# Patient Record
Sex: MALE | Race: WHITE | Employment: FULL TIME | URBAN - METROPOLITAN AREA
[De-identification: names, ages, dates, MRNs, and addresses within clinical notes are randomized per-mention and may not be internally consistent; named-entity substitution may affect disease eponyms.]

---

## 2020-01-30 ENCOUNTER — COMPLETE SKIN EXAM (OUTPATIENT)
Dept: URBAN - METROPOLITAN AREA CLINIC 14 | Facility: CLINIC | Age: 50
Setting detail: DERMATOLOGY
End: 2020-01-30

## 2020-01-30 ENCOUNTER — RX ONLY (RX ONLY)
Age: 50
End: 2020-01-30

## 2020-01-30 DIAGNOSIS — L82.1 OTHER SEBORRHEIC KERATOSIS: ICD-10-CM

## 2020-01-30 DIAGNOSIS — L85.3 XEROSIS CUTIS: ICD-10-CM

## 2020-01-30 DIAGNOSIS — Z85.828 PERSONAL HISTORY OF OTHER MALIGNANT NEOPLASM OF SKIN: ICD-10-CM

## 2020-01-30 PROCEDURE — 11102 TANGNTL BX SKIN SINGLE LES: CPT

## 2020-01-30 PROCEDURE — 99201 LEVEL 1 - NEW: CPT

## 2020-01-30 RX ORDER — DOXYCYCLINE 40 MG/1
1 CAPSULE CAPSULE ORAL DAILY
Qty: 30 | Refills: 11
Start: 2020-01-30

## 2020-01-30 RX ORDER — IVERMECTIN 10 MG/G
1 APPLICATION CREAM TOPICAL DAILY
Qty: 45 | Refills: 11
Start: 2020-01-30

## 2020-01-30 RX ORDER — OXYMETAZOLINE HYDROCHLORIDE 1 G/100G
1 APPLICATION CREAM TOPICAL IN A.M.
Qty: 30 | Refills: 6
Start: 2020-01-30

## 2020-05-06 ENCOUNTER — RX ONLY (RX ONLY)
Age: 50
End: 2020-05-06

## 2020-05-06 ENCOUNTER — FOLLOW-UP (OUTPATIENT)
Dept: URBAN - METROPOLITAN AREA CLINIC 14 | Facility: CLINIC | Age: 50
Setting detail: DERMATOLOGY
End: 2020-05-06

## 2020-05-06 DIAGNOSIS — B07.8 OTHER VIRAL WARTS: ICD-10-CM

## 2020-05-06 PROCEDURE — 99214 OFFICE O/P EST MOD 30 MIN: CPT

## 2020-05-06 RX ORDER — DOXYCYCLINE HYCLATE 50 MG/1
1 TABLET TABLET ORAL BID
Qty: 60 | Refills: 2
Start: 2020-05-06

## 2020-05-06 RX ORDER — KETOCONAZOLE 20 MG/G
1 APPLICATION CREAM TOPICAL DAILY
Qty: 60 | Refills: 1
Start: 2020-05-06

## 2021-06-08 ENCOUNTER — RX ONLY (RX ONLY)
Age: 51
End: 2021-06-08

## 2021-06-08 RX ORDER — DOXYCYCLINE HYCLATE 50 MG/1
1 TABLET TABLET ORAL TWICE A DAY
Qty: 60 | Refills: 0
Start: 2021-06-08

## 2021-06-24 ENCOUNTER — RX ONLY (RX ONLY)
Age: 51
End: 2021-06-24

## 2021-06-24 ENCOUNTER — FOLLOW-UP (OUTPATIENT)
Dept: URBAN - METROPOLITAN AREA CLINIC 14 | Facility: CLINIC | Age: 51
Setting detail: DERMATOLOGY
End: 2021-06-24

## 2021-06-24 DIAGNOSIS — L57.0 ACTINIC KERATOSIS: ICD-10-CM

## 2021-06-24 DIAGNOSIS — L71.0 PERIORAL DERMATITIS: ICD-10-CM

## 2021-06-24 PROCEDURE — 99213 OFFICE O/P EST LOW 20 MIN: CPT

## 2021-06-24 RX ORDER — OXYMETAZOLINE HYDROCHLORIDE 1 G/100G
1 APPLICATION CREAM TOPICAL IN A.M.
Qty: 90 | Refills: 3
Start: 2021-06-24

## 2021-06-24 RX ORDER — IVERMECTIN 10 MG/G
1 APPLICATION CREAM TOPICAL DAILY
Qty: 135 | Refills: 3
Start: 2021-06-24

## 2021-06-24 RX ORDER — DOXYCYCLINE HYCLATE 50 MG/1
1 CAPSULE CAPSULE ORAL ONCE A DAY
Qty: 90 | Refills: 3
Start: 2021-06-24

## 2022-11-16 ENCOUNTER — OFFICE VISIT (OUTPATIENT)
Dept: INTERNAL MEDICINE CLINIC | Facility: CLINIC | Age: 52
End: 2022-11-16
Payer: COMMERCIAL

## 2022-11-16 VITALS
DIASTOLIC BLOOD PRESSURE: 78 MMHG | HEART RATE: 79 BPM | BODY MASS INDEX: 27.34 KG/M2 | OXYGEN SATURATION: 96 % | SYSTOLIC BLOOD PRESSURE: 120 MMHG | WEIGHT: 213 LBS | HEIGHT: 74 IN

## 2022-11-16 DIAGNOSIS — R35.0 FREQUENT URINATION: ICD-10-CM

## 2022-11-16 DIAGNOSIS — N40.1 BENIGN LOCALIZED PROSTATIC HYPERPLASIA WITH LOWER URINARY TRACT SYMPTOMS (LUTS): ICD-10-CM

## 2022-11-16 DIAGNOSIS — A60.02 HERPES GENITALIS IN MEN: ICD-10-CM

## 2022-11-16 DIAGNOSIS — Z12.11 SCREENING FOR COLON CANCER: Primary | ICD-10-CM

## 2022-11-16 DIAGNOSIS — E78.2 MIXED HYPERLIPIDEMIA: ICD-10-CM

## 2022-11-16 DIAGNOSIS — Z12.5 SCREENING FOR PROSTATE CANCER: ICD-10-CM

## 2022-11-16 PROBLEM — L71.9 ROSACEA: Status: ACTIVE | Noted: 2020-09-15

## 2022-11-16 PROCEDURE — 99204 OFFICE O/P NEW MOD 45 MIN: CPT | Performed by: INTERNAL MEDICINE

## 2022-11-16 RX ORDER — DOXYCYCLINE 50 MG/1
50 TABLET ORAL DAILY
COMMUNITY

## 2022-11-16 RX ORDER — IVERMECTIN 10 MG/G
CREAM TOPICAL NIGHTLY
COMMUNITY

## 2022-11-16 RX ORDER — OXYMETAZOLINE HYDROCHLORIDE 1 G/100G
CREAM TOPICAL EVERY MORNING
COMMUNITY

## 2022-11-16 ASSESSMENT — ENCOUNTER SYMPTOMS
NAUSEA: 0
CONSTIPATION: 0
DIARRHEA: 0
BACK PAIN: 0
BLOOD IN STOOL: 0
ABDOMINAL PAIN: 0
SHORTNESS OF BREATH: 0
VOMITING: 0
WHEEZING: 0
SINUS PAIN: 0

## 2022-11-16 ASSESSMENT — PATIENT HEALTH QUESTIONNAIRE - PHQ9
SUM OF ALL RESPONSES TO PHQ QUESTIONS 1-9: 0
2. FEELING DOWN, DEPRESSED OR HOPELESS: 0
1. LITTLE INTEREST OR PLEASURE IN DOING THINGS: 0
SUM OF ALL RESPONSES TO PHQ9 QUESTIONS 1 & 2: 0

## 2022-11-16 NOTE — PROGRESS NOTES
Shanique Shaikh was seen today for new patient. Diagnoses and all orders for this visit:    Screening for colon cancer  -     [facility]    Frequent urination  -     PSA Screening; Future  -     Urinalysis; Future  -     Comprehensive Metabolic Panel; Future  -     TSH; Future    Herpes genitalis in men  Comments:  controlled    Screening for prostate cancer  -     PSA Screening; Future  -     Urinalysis; Future  -     Comprehensive Metabolic Panel; Future  -     TSH; Future    Mixed hyperlipidemia  -     PSA Screening; Future  -     Urinalysis; Future  -     Comprehensive Metabolic Panel; Future  -     TSH; Future    Benign localized prostatic hyperplasia with lower urinary tract symptoms (LUTS)        Danyelle Jiang is a 46 y.o. male    Chief Complaint   Patient presents with    New Patient   Issue is frequent urination, described   As if he drink coffee or etoh, excessive--began slowly over 3 years, Father luts/bph    Cholesterol, high has gotten to 200 but not lower. 2 cups coffee, 1-2 beers daily,  Wakes up to pee. No results found for any previous visit.         Past Medical History:   Diagnosis Date    Hyperlipidemia     Rosacea        Family History   Problem Relation Age of Onset    Elevated Lipids Mother     Elevated Lipids Father     Heart Failure Father     Heart Attack Brother     No Known Problems Brother         Social History     Socioeconomic History    Marital status: Unknown     Spouse name: Not on file    Number of children: Not on file    Years of education: Not on file    Highest education level: Not on file   Occupational History    Not on file   Tobacco Use    Smoking status: Never    Smokeless tobacco: Never   Vaping Use    Vaping Use: Never used   Substance and Sexual Activity    Alcohol use: Yes     Comment: 12 per week    Drug use: Never    Sexual activity: Not on file   Other Topics Concern    Not on file   Social History Narrative    Not on file     Social Determinants of Health     Financial Resource Strain: Not on file   Food Insecurity: Not on file   Transportation Needs: Not on file   Physical Activity: Not on file   Stress: Not on file   Social Connections: Not on file   Intimate Partner Violence: Not on file   Housing Stability: Not on file         Current Outpatient Medications:     doxycycline (ADOXA) 50 MG tablet, Take 50 mg by mouth daily Rx Derm, Disp: , Rfl:     Oxymetazoline HCl (RHOFADE) 1 % CREA, Apply topically every morning Rx Derm, Disp: , Rfl:     Ivermectin (SOOLANTRA) 1 % CREA, Apply topically nightly Rx Derm, Disp: , Rfl:     No Known Allergies      Review of Systems   Constitutional:  Negative for appetite change, chills, fatigue and fever. HENT:  Negative for sinus pain. Respiratory:  Negative for shortness of breath and wheezing. Cardiovascular:  Negative for chest pain. Gastrointestinal:  Negative for abdominal pain, blood in stool, constipation, diarrhea, nausea and vomiting. Genitourinary:  Positive for frequency. Negative for dysuria, penile discharge, penile pain, scrotal swelling and testicular pain. Musculoskeletal:  Negative for back pain and myalgias. Skin:  Positive for rash. Neurological:  Negative for dizziness and seizures. Hematological:  Does not bruise/bleed easily. Psychiatric/Behavioral:  Negative for behavioral problems, dysphoric mood and suicidal ideas. The patient is not nervous/anxious. All other systems reviewed and are negative. Vitals:    11/16/22 1614   BP: 120/78   Pulse: 79   SpO2: 96%   Weight: 213 lb (96.6 kg)   Height: 6' 2\" (1.88 m)           Physical Exam  Constitutional:       Appearance: He is obese. He is not ill-appearing. Eyes:      Extraocular Movements: Extraocular movements intact. Cardiovascular:      Rate and Rhythm: Normal rate and regular rhythm. Pulmonary:      Effort: Pulmonary effort is normal. No respiratory distress. Breath sounds: Normal breath sounds. Abdominal:      General: There is no distension. Musculoskeletal:      Cervical back: Normal range of motion and neck supple. Right lower leg: No edema. Left lower leg: No edema. Skin:     Coloration: Skin is not jaundiced. Neurological:      General: No focal deficit present. Mental Status: He is alert. Mental status is at baseline. Psychiatric:         Mood and Affect: Mood normal.         Thought Content: Thought content normal.          Nahum Eaton was seen today for new patient. Diagnoses and all orders for this visit:    Screening for colon cancer  -     [facility]    Frequent urination  -     PSA Screening; Future  -     Urinalysis; Future  -     Comprehensive Metabolic Panel; Future  -     TSH; Future    Herpes genitalis in men  Comments:  controlled    Screening for prostate cancer  -     PSA Screening; Future  -     Urinalysis; Future  -     Comprehensive Metabolic Panel; Future  -     TSH; Future    Mixed hyperlipidemia  -     PSA Screening; Future  -     Urinalysis; Future  -     Comprehensive Metabolic Panel; Future  -     TSH;  Future    Benign localized prostatic hyperplasia with lower urinary tract symptoms (LUTS)               Michelle Burnett DO

## 2023-12-06 ENCOUNTER — OFFICE VISIT (OUTPATIENT)
Dept: INTERNAL MEDICINE CLINIC | Facility: CLINIC | Age: 53
End: 2023-12-06
Payer: COMMERCIAL

## 2023-12-06 VITALS
OXYGEN SATURATION: 98 % | BODY MASS INDEX: 28.11 KG/M2 | TEMPERATURE: 98.2 F | DIASTOLIC BLOOD PRESSURE: 68 MMHG | HEIGHT: 74 IN | WEIGHT: 219 LBS | SYSTOLIC BLOOD PRESSURE: 112 MMHG | HEART RATE: 82 BPM

## 2023-12-06 DIAGNOSIS — Z12.11 SCREENING FOR COLON CANCER: Primary | ICD-10-CM

## 2023-12-06 DIAGNOSIS — Z00.00 ROUTINE ADULT HEALTH MAINTENANCE: ICD-10-CM

## 2023-12-06 DIAGNOSIS — E78.2 MIXED HYPERLIPIDEMIA: ICD-10-CM

## 2023-12-06 PROCEDURE — 99396 PREV VISIT EST AGE 40-64: CPT | Performed by: INTERNAL MEDICINE

## 2023-12-06 RX ORDER — CHLORHEXIDINE GLUCONATE ORAL RINSE 1.2 MG/ML
SOLUTION DENTAL
COMMUNITY
Start: 2023-11-20

## 2023-12-06 SDOH — ECONOMIC STABILITY: FOOD INSECURITY: WITHIN THE PAST 12 MONTHS, YOU WORRIED THAT YOUR FOOD WOULD RUN OUT BEFORE YOU GOT MONEY TO BUY MORE.: NEVER TRUE

## 2023-12-06 SDOH — ECONOMIC STABILITY: HOUSING INSECURITY
IN THE LAST 12 MONTHS, WAS THERE A TIME WHEN YOU DID NOT HAVE A STEADY PLACE TO SLEEP OR SLEPT IN A SHELTER (INCLUDING NOW)?: NO

## 2023-12-06 SDOH — ECONOMIC STABILITY: FOOD INSECURITY: WITHIN THE PAST 12 MONTHS, THE FOOD YOU BOUGHT JUST DIDN'T LAST AND YOU DIDN'T HAVE MONEY TO GET MORE.: NEVER TRUE

## 2023-12-06 SDOH — ECONOMIC STABILITY: INCOME INSECURITY: HOW HARD IS IT FOR YOU TO PAY FOR THE VERY BASICS LIKE FOOD, HOUSING, MEDICAL CARE, AND HEATING?: NOT HARD AT ALL

## 2023-12-06 ASSESSMENT — PATIENT HEALTH QUESTIONNAIRE - PHQ9
SUM OF ALL RESPONSES TO PHQ QUESTIONS 1-9: 0
SUM OF ALL RESPONSES TO PHQ QUESTIONS 1-9: 0
2. FEELING DOWN, DEPRESSED OR HOPELESS: 0
1. LITTLE INTEREST OR PLEASURE IN DOING THINGS: 0
SUM OF ALL RESPONSES TO PHQ9 QUESTIONS 1 & 2: 0
SUM OF ALL RESPONSES TO PHQ QUESTIONS 1-9: 0
SUM OF ALL RESPONSES TO PHQ QUESTIONS 1-9: 0

## 2023-12-06 ASSESSMENT — ENCOUNTER SYMPTOMS
CONSTIPATION: 0
SHORTNESS OF BREATH: 0
CHEST TIGHTNESS: 0
COLOR CHANGE: 0
DIARRHEA: 0
WHEEZING: 0
NAUSEA: 0
SINUS PAIN: 0
ABDOMINAL PAIN: 0
VOMITING: 0

## 2024-01-10 ENCOUNTER — NURSE ONLY (OUTPATIENT)
Dept: INTERNAL MEDICINE CLINIC | Facility: CLINIC | Age: 54
End: 2024-01-10

## 2024-01-10 DIAGNOSIS — E78.2 MIXED HYPERLIPIDEMIA: ICD-10-CM

## 2024-01-10 LAB
ALBUMIN SERPL-MCNC: 4.3 G/DL (ref 3.5–5)
ALBUMIN/GLOB SERPL: 1.3 (ref 0.4–1.6)
ALP SERPL-CCNC: 70 U/L (ref 50–136)
ALT SERPL-CCNC: 45 U/L (ref 12–65)
ANION GAP SERPL CALC-SCNC: 3 MMOL/L (ref 2–11)
AST SERPL-CCNC: 22 U/L (ref 15–37)
BILIRUB SERPL-MCNC: 0.5 MG/DL (ref 0.2–1.1)
BUN SERPL-MCNC: 16 MG/DL (ref 6–23)
CALCIUM SERPL-MCNC: 9.5 MG/DL (ref 8.3–10.4)
CHLORIDE SERPL-SCNC: 107 MMOL/L (ref 103–113)
CO2 SERPL-SCNC: 29 MMOL/L (ref 21–32)
CREAT SERPL-MCNC: 1 MG/DL (ref 0.8–1.5)
ERYTHROCYTE [DISTWIDTH] IN BLOOD BY AUTOMATED COUNT: 11.9 % (ref 11.9–14.6)
GLOBULIN SER CALC-MCNC: 3.3 G/DL (ref 2.8–4.5)
GLUCOSE SERPL-MCNC: 85 MG/DL (ref 65–100)
HCT VFR BLD AUTO: 43.6 % (ref 41.1–50.3)
HGB BLD-MCNC: 15.4 G/DL (ref 13.6–17.2)
MCH RBC QN AUTO: 31.4 PG (ref 26.1–32.9)
MCHC RBC AUTO-ENTMCNC: 35.3 G/DL (ref 31.4–35)
MCV RBC AUTO: 88.8 FL (ref 82–102)
NRBC # BLD: 0 K/UL (ref 0–0.2)
PLATELET # BLD AUTO: 244 K/UL (ref 150–450)
PMV BLD AUTO: 10.3 FL (ref 9.4–12.3)
POTASSIUM SERPL-SCNC: 4 MMOL/L (ref 3.5–5.1)
PROT SERPL-MCNC: 7.6 G/DL (ref 6.3–8.2)
PSA SERPL-MCNC: 1.9 NG/ML
RBC # BLD AUTO: 4.91 M/UL (ref 4.23–5.6)
SODIUM SERPL-SCNC: 139 MMOL/L (ref 136–146)
TSH, 3RD GENERATION: 1.71 UIU/ML (ref 0.36–3.74)
WBC # BLD AUTO: 5.7 K/UL (ref 4.3–11.1)

## 2024-01-15 ENCOUNTER — TELEPHONE (OUTPATIENT)
Dept: GASTROENTEROLOGY | Age: 54
End: 2024-01-15

## 2024-12-30 ENCOUNTER — OFFICE VISIT (OUTPATIENT)
Dept: INTERNAL MEDICINE CLINIC | Facility: CLINIC | Age: 54
End: 2024-12-30
Payer: COMMERCIAL

## 2024-12-30 VITALS
TEMPERATURE: 98.3 F | WEIGHT: 216 LBS | HEIGHT: 74 IN | OXYGEN SATURATION: 96 % | HEART RATE: 81 BPM | DIASTOLIC BLOOD PRESSURE: 60 MMHG | BODY MASS INDEX: 27.72 KG/M2 | SYSTOLIC BLOOD PRESSURE: 100 MMHG

## 2024-12-30 DIAGNOSIS — E78.2 MIXED HYPERLIPIDEMIA: Primary | ICD-10-CM

## 2024-12-30 DIAGNOSIS — F32.A DEPRESSION, UNSPECIFIED DEPRESSION TYPE: ICD-10-CM

## 2024-12-30 DIAGNOSIS — Z00.00 ROUTINE ADULT HEALTH MAINTENANCE: ICD-10-CM

## 2024-12-30 DIAGNOSIS — L71.9 ROSACEA: ICD-10-CM

## 2024-12-30 DIAGNOSIS — Z12.11 SCREENING FOR COLON CANCER: ICD-10-CM

## 2024-12-30 DIAGNOSIS — Z12.5 SCREENING FOR PROSTATE CANCER: ICD-10-CM

## 2024-12-30 PROCEDURE — 99214 OFFICE O/P EST MOD 30 MIN: CPT | Performed by: INTERNAL MEDICINE

## 2024-12-30 RX ORDER — ESCITALOPRAM OXALATE 10 MG/1
10 TABLET ORAL DAILY
Qty: 90 TABLET | Refills: 3 | Status: SHIPPED | OUTPATIENT
Start: 2024-12-30

## 2024-12-30 RX ORDER — KETOCONAZOLE 20 MG/G
CREAM TOPICAL PRN
COMMUNITY
Start: 1990-10-30

## 2024-12-30 SDOH — ECONOMIC STABILITY: FOOD INSECURITY: WITHIN THE PAST 12 MONTHS, YOU WORRIED THAT YOUR FOOD WOULD RUN OUT BEFORE YOU GOT MONEY TO BUY MORE.: NEVER TRUE

## 2024-12-30 SDOH — ECONOMIC STABILITY: FOOD INSECURITY: WITHIN THE PAST 12 MONTHS, THE FOOD YOU BOUGHT JUST DIDN'T LAST AND YOU DIDN'T HAVE MONEY TO GET MORE.: NEVER TRUE

## 2024-12-30 SDOH — ECONOMIC STABILITY: INCOME INSECURITY: HOW HARD IS IT FOR YOU TO PAY FOR THE VERY BASICS LIKE FOOD, HOUSING, MEDICAL CARE, AND HEATING?: NOT HARD AT ALL

## 2024-12-30 SDOH — ECONOMIC STABILITY: TRANSPORTATION INSECURITY
IN THE PAST 12 MONTHS, HAS LACK OF TRANSPORTATION KEPT YOU FROM MEETINGS, WORK, OR FROM GETTING THINGS NEEDED FOR DAILY LIVING?: NO

## 2024-12-30 ASSESSMENT — PATIENT HEALTH QUESTIONNAIRE - PHQ9
4. FEELING TIRED OR HAVING LITTLE ENERGY: SEVERAL DAYS
1. LITTLE INTEREST OR PLEASURE IN DOING THINGS: MORE THAN HALF THE DAYS
SUM OF ALL RESPONSES TO PHQ9 QUESTIONS 1 & 2: 4
2. FEELING DOWN, DEPRESSED OR HOPELESS: MORE THAN HALF THE DAYS
SUM OF ALL RESPONSES TO PHQ QUESTIONS 1-9: 10
5. POOR APPETITE OR OVEREATING: SEVERAL DAYS
4. FEELING TIRED OR HAVING LITTLE ENERGY: SEVERAL DAYS
6. FEELING BAD ABOUT YOURSELF - OR THAT YOU ARE A FAILURE OR HAVE LET YOURSELF OR YOUR FAMILY DOWN: SEVERAL DAYS
SUM OF ALL RESPONSES TO PHQ QUESTIONS 1-9: 10
7. TROUBLE CONCENTRATING ON THINGS, SUCH AS READING THE NEWSPAPER OR WATCHING TELEVISION: SEVERAL DAYS
SUM OF ALL RESPONSES TO PHQ9 QUESTIONS 1 & 2: 4
3. TROUBLE FALLING OR STAYING ASLEEP: SEVERAL DAYS
1. LITTLE INTEREST OR PLEASURE IN DOING THINGS: MORE THAN HALF THE DAYS
8. MOVING OR SPEAKING SO SLOWLY THAT OTHER PEOPLE COULD HAVE NOTICED. OR THE OPPOSITE - BEING SO FIDGETY OR RESTLESS THAT YOU HAVE BEEN MOVING AROUND A LOT MORE THAN USUAL: SEVERAL DAYS
7. TROUBLE CONCENTRATING ON THINGS, SUCH AS READING THE NEWSPAPER OR WATCHING TELEVISION: SEVERAL DAYS
10. IF YOU CHECKED OFF ANY PROBLEMS, HOW DIFFICULT HAVE THESE PROBLEMS MADE IT FOR YOU TO DO YOUR WORK, TAKE CARE OF THINGS AT HOME, OR GET ALONG WITH OTHER PEOPLE: SOMEWHAT DIFFICULT
SUM OF ALL RESPONSES TO PHQ QUESTIONS 1-9: 10
8. MOVING OR SPEAKING SO SLOWLY THAT OTHER PEOPLE COULD HAVE NOTICED. OR THE OPPOSITE, BEING SO FIGETY OR RESTLESS THAT YOU HAVE BEEN MOVING AROUND A LOT MORE THAN USUAL: SEVERAL DAYS
2. FEELING DOWN, DEPRESSED OR HOPELESS: MORE THAN HALF THE DAYS
3. TROUBLE FALLING OR STAYING ASLEEP: SEVERAL DAYS
6. FEELING BAD ABOUT YOURSELF - OR THAT YOU ARE A FAILURE OR HAVE LET YOURSELF OR YOUR FAMILY DOWN: SEVERAL DAYS
10. IF YOU CHECKED OFF ANY PROBLEMS, HOW DIFFICULT HAVE THESE PROBLEMS MADE IT FOR YOU TO DO YOUR WORK, TAKE CARE OF THINGS AT HOME, OR GET ALONG WITH OTHER PEOPLE: SOMEWHAT DIFFICULT
SUM OF ALL RESPONSES TO PHQ QUESTIONS 1-9: 10
SUM OF ALL RESPONSES TO PHQ QUESTIONS 1-9: 10
9. THOUGHTS THAT YOU WOULD BE BETTER OFF DEAD, OR OF HURTING YOURSELF: NOT AT ALL
5. POOR APPETITE OR OVEREATING: SEVERAL DAYS
9. THOUGHTS THAT YOU WOULD BE BETTER OFF DEAD, OR OF HURTING YOURSELF: NOT AT ALL

## 2024-12-30 NOTE — PROGRESS NOTES
Suman Thomas (: 1970) is a 54 y.o. male, here for evaluation of the following chief complaint(s):  Follow-up (Yearly check up) and Medication Refill (Discuss)     Assessment & Plan  1. Depression.  He has been experiencing symptoms of depression, including excessive sleepiness and disengagement from family activities. He has previously been on citalopram and found it helpful but experienced side effects such as difficulty achieving orgasm. A prescription for Lexapro 10 mg has been issued as it may have fewer side effects. He is advised to take it for at least 6 months to achieve longer-lasting effects. If he experiences any issues with Lexapro, he should inform the office.    2. Health maintenance.  A referral for a colonoscopy has been made. He is advised to receive the shingles vaccine at age 50, Tdap vaccine once, annual influenza vaccine, and COVID-19 vaccine as per current recommendations. An HIV and hepatitis C screen will be conducted as part of routine health maintenance. Blood work will be done today to monitor his overall health status.    3. Frequent urination.  His PSA level is within the normal range at 1.9.  1. Mixed hyperlipidemia  2. Rosacea  3. Routine adult health maintenance  -     Hemoglobin A1C; Future  -     Lipid Panel; Future  -     PSA Screening; Future  -     Comprehensive Metabolic Panel; Future  -     CBC; Future  -     TSH reflex to FT4; Future  -     Hepatitis C Antibody; Future  -     HIV 1/2 Ag/Ab, 4TH Generation,W Rflx Confirm; Future  4. Depression, unspecified depression type  -     Hemoglobin A1C; Future  -     Lipid Panel; Future  -     PSA Screening; Future  -     Comprehensive Metabolic Panel; Future  -     CBC; Future  -     TSH reflex to FT4; Future  5. Screening for prostate cancer  -     PSA Screening; Future  6. Screening for colon cancer  -     [facility]      History of Present Illness  The patient is a 54-year-old male who

## 2025-01-15 ENCOUNTER — TELEPHONE (OUTPATIENT)
Dept: GASTROENTEROLOGY | Age: 55
End: 2025-01-15

## 2025-01-15 ENCOUNTER — PREP FOR PROCEDURE (OUTPATIENT)
Dept: GASTROENTEROLOGY | Age: 55
End: 2025-01-15

## 2025-01-15 DIAGNOSIS — Z12.11 ENCOUNTER FOR SCREENING COLONOSCOPY: ICD-10-CM

## 2025-01-15 NOTE — TELEPHONE ENCOUNTER
Patient has been scheduled for direct colon screening with Dr Aquino on 03/19/25 - prep instructions have been mailed to patients home address        GASTROENTEROLOGY    MIRALAX/GATORADE PREP             You are scheduled for a colonoscopy on: 03/19/2025    Items to purchase at pharmacy several days before your test:    Dulcolax tablets-- (LAXATIVE ONLY- not stool softener)  238-gram bottle of MiraLAX  64 ounces Gatorade/Crystal Lite/apple juice (NO RED, BLUE, OR PURPLE IN COLOR)    The Day before your test:    Clear liquids only for the entire day (water, coffee, tea, soda, Jell-O, ice popsicles, broth, apple juice, etc.) NO MILK, CREAMER, OR DAIRY PRODUCTS. NO SOLID FOODS. NOTHING RED, BLUE, OR PURPLE IN COLOR.  Mix MiraLAX and Gatorade together, shake the solution until the MiraLAX is dissolved. Chill before drinking.   At 5:00pm - Start drinking MiraLAX solution until half (½) is gone, put remainder in the refrigerator.   At 5:00pm - Take 2 Dulcolax tablets with 16 oz of water    The day of your test:     6 HOURS PRIOR TO PROCEDURE - drink other half of MiraLAX solution until gone.  2 HOURS PRIOR TO ARRIVAL - all patients are requested to drink 32 oz of   non-caffeinated clear liquids to avoid dehydration.      IMPORTANT: IF YOU DO NOT FOLLOW THESE DIRECTIONS, YOUR COLONOSCOPY COULD BE CANCELED DUE TO HAVING AN UNCLEAR PREP      GETTING READY FOR [place] [address]  [phone]    You will need to  your bowel prep from your local pharmacy.    You must arrange for someone 18 years or older to come with you to and from your colonoscopy and stay during your entire visit to drive you home. Your colonoscopy may need to be rescheduled if you do not have a .    Check with your insurance company before your procedure to ensure coverage and plan to bring copays if necessary.    You will be getting a call from our

## 2025-02-03 RX ORDER — SODIUM CHLORIDE 0.9 % (FLUSH) 0.9 %
5-40 SYRINGE (ML) INJECTION PRN
Status: CANCELLED | OUTPATIENT
Start: 2025-02-03

## 2025-02-03 RX ORDER — SODIUM CHLORIDE 9 MG/ML
25 INJECTION, SOLUTION INTRAVENOUS PRN
Status: CANCELLED | OUTPATIENT
Start: 2025-02-03

## 2025-02-03 RX ORDER — SODIUM CHLORIDE 0.9 % (FLUSH) 0.9 %
5-40 SYRINGE (ML) INJECTION EVERY 12 HOURS SCHEDULED
Status: CANCELLED | OUTPATIENT
Start: 2025-02-03

## 2025-02-14 PROBLEM — Z12.11 ENCOUNTER FOR SCREENING COLONOSCOPY: Status: RESOLVED | Noted: 2025-01-15 | Resolved: 2025-02-14

## 2025-02-26 PROBLEM — Z12.11 ENCOUNTER FOR SCREENING COLONOSCOPY: Status: ACTIVE | Noted: 2025-01-15

## 2025-03-18 RX ORDER — NALOXONE HYDROCHLORIDE 0.4 MG/ML
INJECTION, SOLUTION INTRAMUSCULAR; INTRAVENOUS; SUBCUTANEOUS PRN
Status: CANCELLED | OUTPATIENT
Start: 2025-03-18

## 2025-03-18 NOTE — PERIOP NOTE
Multiple unsuccessful attempts made to reach patient for PAT phone assessment at both numbers listed with voicemail messages left requesting return call. No additional contact numbers found in Care Everywhere. Staff message sent to Rah Rizo at surgeon's office requesting additional contact number if available and to reach out to patient to call PAT.

## 2025-03-18 NOTE — PROGRESS NOTES
**LVM with CB#. No mychart available.     Dear Suman Thomas,     Thank you for choosing [facility] for your upcoming endoscopic procedure. We appreciate the trust you have placed in us to provide your care.     Important Details Regarding Your Upcoming Procedure:     Place: Main lobby of [address] [place] [address].     Preparation: Refer to both provider and pre-assessment and prep instructions.     Arrival: Please arrive at the main entrance of the hospital, located past the statue of [address]. Once inside, proceed to the registration desk on the left in the main lobby.     Time of arrival: 0700 am on 03/19/2025    Accompaniment: Please note that you will need a  who is 18 years old or greater to stay with you throughout the entire process, your  must not leave while you are in our care. This is a requirement for your safety and care.    Cancellation: If you are unable to keep this appointment, please contact the doctor's office associated with your procedure as soon as possible. We look forward to providing you with exceptional care and service. If you have any questions or concerns, please do not hesitate to reach out to us.     Sincerely, [facility] Endoscopy Team

## 2025-03-18 NOTE — PERIOP NOTE
Patient verified name, , and procedure.    Type: 1a; abbreviated assessment per anesthesia guidelines    Labs per anesthesia: None    Instructed pt that they will be notified the day before their procedure by the GI Lab for time of arrival if their procedure is [place] and Pre-op for [place] cases. Arrival times should be called by 5 pm. If no phone is received the patient should contact their respective hospital. The GI lab telephone number is [phone] and ES Pre-op is [phone].     Follow diet and prep instructions per office. May have clear liquids up to 2 hours prior to hospital arrive time.      Bath or shower the night before and the am of surgery with non-moisturizing soap. No lotions, oils, powders, cologne on skin. No make up, eye make up or jewelry. Wear loose fitting comfortable, clean clothing.     Must have adult present in building the entire time .     Medications for the day of procedure Lexapro    Please hold all vitamins x 7 days prior to procedure and NSAIDS (Aspirin, Excedrin, Goody powders, Motrin, Ibuprofen, Advil, Aleve and Naproxen) x 5 days prior to procedure. Should you have a procedure date that does not allow for the amount of time instructed above, please stop taking vitamins, supplements, and NSAIDS IMMEDIATELY.     The following discharge instructions reviewed with patient: medication given during procedure may cause drowsiness for several hours, therefore, do not drive or operate machinery for remainder of the day. You may not drink alcohol on the day of your procedure, please resume regular diet and activity unless otherwise directed. You may experience abdominal distention for several hours that is relieved by the passage of gas. Contact your physician if you have any of the following: fever or chills, severe abdominal pain or excessive amount of bleeding or a large amount when having a bowel movement. Occasional specks of blood with bowel movement would not be unusual.

## 2025-03-19 ENCOUNTER — ANESTHESIA EVENT (OUTPATIENT)
Dept: ENDOSCOPY | Age: 55
End: 2025-03-19
Payer: COMMERCIAL

## 2025-03-19 ENCOUNTER — HOSPITAL ENCOUNTER (OUTPATIENT)
Age: 55
Discharge: HOME | End: 2025-03-19
Attending: STUDENT IN AN ORGANIZED HEALTH CARE EDUCATION/TRAINING PROGRAM | Admitting: STUDENT IN AN ORGANIZED HEALTH CARE EDUCATION/TRAINING PROGRAM
Payer: COMMERCIAL

## 2025-03-19 ENCOUNTER — ANESTHESIA (OUTPATIENT)
Dept: ENDOSCOPY | Age: 55
End: 2025-03-19
Payer: COMMERCIAL

## 2025-03-19 VITALS
HEART RATE: 79 BPM | OXYGEN SATURATION: 97 % | HEIGHT: 74 IN | DIASTOLIC BLOOD PRESSURE: 64 MMHG | SYSTOLIC BLOOD PRESSURE: 123 MMHG | TEMPERATURE: 97.7 F | BODY MASS INDEX: 26.95 KG/M2 | WEIGHT: 210 LBS | RESPIRATION RATE: 16 BRPM

## 2025-03-19 PROCEDURE — 45378 DIAGNOSTIC COLONOSCOPY: CPT | Performed by: STUDENT IN AN ORGANIZED HEALTH CARE EDUCATION/TRAINING PROGRAM

## 2025-03-19 PROCEDURE — 6360000002 HC RX W HCPCS

## 2025-03-19 PROCEDURE — 2580000003 HC RX 258: Performed by: ANESTHESIOLOGY

## 2025-03-19 PROCEDURE — 3700000001 HC ADD 15 MINUTES (ANESTHESIA): Performed by: STUDENT IN AN ORGANIZED HEALTH CARE EDUCATION/TRAINING PROGRAM

## 2025-03-19 PROCEDURE — 7100000010 HC PHASE II RECOVERY - FIRST 15 MIN: Performed by: STUDENT IN AN ORGANIZED HEALTH CARE EDUCATION/TRAINING PROGRAM

## 2025-03-19 PROCEDURE — 7100000011 HC PHASE II RECOVERY - ADDTL 15 MIN: Performed by: STUDENT IN AN ORGANIZED HEALTH CARE EDUCATION/TRAINING PROGRAM

## 2025-03-19 PROCEDURE — 2709999900 HC NON-CHARGEABLE SUPPLY: Performed by: STUDENT IN AN ORGANIZED HEALTH CARE EDUCATION/TRAINING PROGRAM

## 2025-03-19 PROCEDURE — 3700000000 HC ANESTHESIA ATTENDED CARE: Performed by: STUDENT IN AN ORGANIZED HEALTH CARE EDUCATION/TRAINING PROGRAM

## 2025-03-19 PROCEDURE — 3609027000 HC COLONOSCOPY: Performed by: STUDENT IN AN ORGANIZED HEALTH CARE EDUCATION/TRAINING PROGRAM

## 2025-03-19 RX ORDER — LIDOCAINE HYDROCHLORIDE 10 MG/ML
1 INJECTION, SOLUTION INFILTRATION; PERINEURAL
Status: DISCONTINUED | OUTPATIENT
Start: 2025-03-19 | End: 2025-03-19 | Stop reason: HOSPADM

## 2025-03-19 RX ORDER — SODIUM CHLORIDE 0.9 % (FLUSH) 0.9 %
5-40 SYRINGE (ML) INJECTION PRN
Status: DISCONTINUED | OUTPATIENT
Start: 2025-03-19 | End: 2025-03-19 | Stop reason: HOSPADM

## 2025-03-19 RX ORDER — SODIUM CHLORIDE 0.9 % (FLUSH) 0.9 %
5-40 SYRINGE (ML) INJECTION EVERY 12 HOURS SCHEDULED
Status: DISCONTINUED | OUTPATIENT
Start: 2025-03-19 | End: 2025-03-19 | Stop reason: HOSPADM

## 2025-03-19 RX ORDER — SODIUM CHLORIDE, SODIUM LACTATE, POTASSIUM CHLORIDE, CALCIUM CHLORIDE 600; 310; 30; 20 MG/100ML; MG/100ML; MG/100ML; MG/100ML
INJECTION, SOLUTION INTRAVENOUS CONTINUOUS
Status: DISCONTINUED | OUTPATIENT
Start: 2025-03-19 | End: 2025-03-19 | Stop reason: HOSPADM

## 2025-03-19 RX ORDER — SODIUM CHLORIDE 9 MG/ML
INJECTION, SOLUTION INTRAVENOUS PRN
Status: DISCONTINUED | OUTPATIENT
Start: 2025-03-19 | End: 2025-03-19 | Stop reason: HOSPADM

## 2025-03-19 RX ORDER — PROPOFOL 10 MG/ML
INJECTION, EMULSION INTRAVENOUS
Status: DISCONTINUED | OUTPATIENT
Start: 2025-03-19 | End: 2025-03-19 | Stop reason: SDUPTHER

## 2025-03-19 RX ORDER — SODIUM CHLORIDE 9 MG/ML
25 INJECTION, SOLUTION INTRAVENOUS PRN
Status: DISCONTINUED | OUTPATIENT
Start: 2025-03-19 | End: 2025-03-19 | Stop reason: HOSPADM

## 2025-03-19 RX ORDER — LIDOCAINE HYDROCHLORIDE 5 MG/ML
INJECTION, SOLUTION INFILTRATION; INTRAVENOUS
Status: DISCONTINUED | OUTPATIENT
Start: 2025-03-19 | End: 2025-03-19 | Stop reason: SDUPTHER

## 2025-03-19 RX ADMIN — LIDOCAINE HYDROCHLORIDE 50 MG: 5 INJECTION, SOLUTION INFILTRATION at 08:22

## 2025-03-19 RX ADMIN — PROPOFOL 180 MCG/KG/MIN: 10 INJECTION, EMULSION INTRAVENOUS at 08:23

## 2025-03-19 RX ADMIN — PROPOFOL 80 MG: 10 INJECTION, EMULSION INTRAVENOUS at 08:22

## 2025-03-19 RX ADMIN — SODIUM CHLORIDE, SODIUM LACTATE, POTASSIUM CHLORIDE, AND CALCIUM CHLORIDE: 600; 310; 30; 20 INJECTION, SOLUTION INTRAVENOUS at 07:38

## 2025-03-19 ASSESSMENT — PAIN - FUNCTIONAL ASSESSMENT: PAIN_FUNCTIONAL_ASSESSMENT: NONE - DENIES PAIN

## 2025-03-19 NOTE — ANESTHESIA PRE PROCEDURE
Department of Anesthesiology  Preprocedure Note       Name:  Suman Thomas   Age:  54 y.o.  :  1970                                          MRN:  [id number]         Date:  3/19/2025      Surgeon: Surgeon(s):  Balbina Aquino MD    Procedure: Procedure(s):  COLORECTAL CANCER SCREENING, NOT HIGH RISK    Medications prior to admission:   Prior to Admission medications    Medication Sig Start Date End Date Taking? Authorizing Provider   escitalopram (LEXAPRO) 10 MG tablet Take 1 tablet by mouth daily 24  Yes Costa Vo DO   ketoconazole (NIZORAL) 2 % cream Apply topically as needed  Patient not taking: Reported on 3/18/2025 10/30/1990   Allie Pierre MD   Oxymetazoline HCl (RHOFADE) 1 % CREA Apply topically every morning Rx Derm  Patient not taking: Reported on 2023    Allie Pierre MD   Ivermectin (SOOLANTRA) 1 % CREA Apply topically nightly Rx Derm  Patient not taking: Reported on 3/18/2025    Allie Pierre MD       Current medications:    Current Facility-Administered Medications   Medication Dose Route Frequency Provider Last Rate Last Admin   • lidocaine 1 % injection 1 mL  1 mL IntraDERmal Once PRN Alexis Boyd MD       • lactated ringers infusion   IntraVENous Continuous Alexis Boyd MD       • sodium chloride flush 0.9 % injection 5-40 mL  5-40 mL IntraVENous 2 times per day Alexis Boyd MD       • sodium chloride flush 0.9 % injection 5-40 mL  5-40 mL IntraVENous PRN Alexis Boyd MD       • 0.9 % sodium chloride infusion   IntraVENous PRN Alexis Boyd MD       • sodium chloride flush 0.9 % injection 5-40 mL  5-40 mL IntraVENous 2 times per day Balbina Aquino MD       • sodium chloride flush 0.9 % injection 5-40 mL  5-40 mL IntraVENous PRN Balbina Aquino MD       • 0.9 % sodium chloride infusion  25 mL IntraVENous PRN Balbina Aquino MD           Allergies:  No Known Allergies    Problem List:    Patient Active

## 2025-03-19 NOTE — DISCHARGE INSTRUCTIONS
Gastrointestinal Colonoscopy/Flexible Sigmoidoscopy - Lower Exam Discharge Instructions    Call Dr. Aquino at  for any problems or questions.  Contact the doctor’s office for follow up appointment as directed  Medication may cause drowsiness for several hours, therefore:  Do not drive or operate machinery for reminder of the day.  No alcohol today.  Do not make any important or legal decisions for 24 hours.  Do not sign any legal documents for 24 hours.  Ordinarily, you may resume regular diet and activity after exam unless otherwise specified by your physician.  Because of air put into your colon during exam, you may experience some abdominal distension, relieved by the passage of gas, for several hours.  Contact your physician if you have any of the following:  Excessive amount of bleeding - large amount when having a bowel movement.  Occasional specks of blood with bowel movement would not be unusual.  Severe abdominal pain  Fever or Chills  Polyp Removal - follow these additional instructions  As needed for constipation, take Metamucil - 1 tablespoon in juice every morning for 3 days  No Aspirin, Advil, Aleve, Nuprin, Ibuprofen, or medications that contain these drugs for 2 weeks.      Discharge instructions were reviewed with patient. An opportunity was given for questions. All medications were reviewed, and information was given on the new medications. Patient verbalized understanding, and has no questions at this time.

## 2025-03-19 NOTE — H&P
Suman Thomas is 54 y.o. y/o male here for screening colonoscopy.    No immediate (parents/siblings) FH of colon cancer, no acute symptoms.     Past Medical History:   Diagnosis Date    ADHD (attention deficit hyperactivity disorder)     Depression     Hyperlipidemia     Rosacea      Past Surgical History:   Procedure Laterality Date    EYE SURGERY      Lasic    HAND SURGERY Right     Fx of 4 & 5 metacarpal    RECONSTRUCTION OF NOSE      x 2     Family History   Problem Relation Age of Onset    Elevated Lipids Mother     Arthritis Mother     Elevated Lipids Father     Heart Failure Father     High Cholesterol Father     Heart Attack Brother     No Known Problems Brother      Social History     Tobacco Use    Smoking status: Never    Smokeless tobacco: Never   Vaping Use    Vaping status: Never Used   Substance Use Topics    Alcohol use: Yes     Alcohol/week: 12.0 standard drinks of alcohol     Types: 12 Cans of beer per week     Comment: 12 per week    Drug use: Never     No Known Allergies  Current Outpatient Medications   Medication Instructions    escitalopram (LEXAPRO) 10 mg, Oral, DAILY    Ivermectin (SOOLANTRA) 1 % CREA NIGHTLY    ketoconazole (NIZORAL) 2 % cream PRN    Oxymetazoline HCl (RHOFADE) 1 % CREA EVERY MORNING     Review of Systems    ROS:    A complete 11 system ROS was performed and was negative aside from the pertinent negative and positives noted above.     PE:   There were no vitals filed for this visit.   General:  The patient appears well-nourished, and is in no acute distress.    Skin:  no rash, ulcers. No Bleeding or signs of infection.  HEENT:  Normocephalic, atraumatic. No sclerae icterus.   Neck:  No pain on palpation or mobilization of the neck.  Respiratory: Respiratory effort is normal. Expansion maintained bilaterally and symmetrically. Normal breath sounds and clear to auscultation bilaterally without wheezes, rales, or rhonchi.    Cardiovascular:  Regular rate and rhythm.

## 2025-03-19 NOTE — ANESTHESIA POSTPROCEDURE EVALUATION
Department of Anesthesiology  Postprocedure Note    Patient: Suman Thomas  MRN: [id number]  YOB: 1970  Date of evaluation: 3/19/2025    Procedure Summary       Date: 03/19/25 Room / Location: [facility] ENDO 03 / [facility] ENDOSCOPY    Anesthesia Start: 0820 Anesthesia Stop: 0841    Procedure: COLORECTAL CANCER SCREENING, NOT HIGH RISK Diagnosis:       Encounter for screening colonoscopy      (Encounter for screening colonoscopy [Z12.11])    Surgeons: Balbina Aquino MD Responsible Provider: Alexis Boyd MD    Anesthesia Type: TIVA ASA Status: 2            Anesthesia Type: No value filed.    Haley Phase I: Haley Score: 10    Haley Phase II: Haley Score: 10    Anesthesia Post Evaluation    Patient location during evaluation: PACU  Patient participation: complete - patient participated  Level of consciousness: awake and alert  Airway patency: patent  Nausea & Vomiting: no nausea and no vomiting  Cardiovascular status: hemodynamically stable  Respiratory status: acceptable, nonlabored ventilation and spontaneous ventilation  Hydration status: euvolemic  Comments: /64   Pulse 79   Temp 97.7 °F (36.5 °C) (Skin)   Resp 16   Ht 1.88 m (6' 2\")   Wt 95.3 kg (210 lb)   SpO2 97%   BMI 26.96 kg/m²     Multimodal analgesia pain management approach  Pain management: adequate and satisfactory to patient    No notable events documented.

## 2025-03-28 PROBLEM — Z12.11 ENCOUNTER FOR SCREENING COLONOSCOPY: Status: RESOLVED | Noted: 2025-01-15 | Resolved: 2025-03-28

## 2025-04-16 ENCOUNTER — LAB (OUTPATIENT)
Dept: INTERNAL MEDICINE CLINIC | Facility: CLINIC | Age: 55
End: 2025-04-16

## 2025-04-16 DIAGNOSIS — F32.A DEPRESSION, UNSPECIFIED DEPRESSION TYPE: ICD-10-CM

## 2025-04-16 DIAGNOSIS — Z12.5 SCREENING FOR PROSTATE CANCER: ICD-10-CM

## 2025-04-16 DIAGNOSIS — Z00.00 ROUTINE ADULT HEALTH MAINTENANCE: ICD-10-CM

## 2025-04-16 LAB
ALBUMIN SERPL-MCNC: 4.3 G/DL (ref 3.5–5)
ALBUMIN/GLOB SERPL: 1.3 (ref 1–1.9)
ALP SERPL-CCNC: 71 U/L (ref 40–129)
ALT SERPL-CCNC: 31 U/L (ref 8–55)
ANION GAP SERPL CALC-SCNC: 9 MMOL/L (ref 7–16)
AST SERPL-CCNC: 27 U/L (ref 15–37)
BILIRUB SERPL-MCNC: 0.6 MG/DL (ref 0–1.2)
BUN SERPL-MCNC: 13 MG/DL (ref 6–23)
CALCIUM SERPL-MCNC: 9.7 MG/DL (ref 8.8–10.2)
CHLORIDE SERPL-SCNC: 106 MMOL/L (ref 98–107)
CHOLEST SERPL-MCNC: 255 MG/DL (ref 0–200)
CO2 SERPL-SCNC: 26 MMOL/L (ref 20–29)
CREAT SERPL-MCNC: 1 MG/DL (ref 0.8–1.3)
ERYTHROCYTE [DISTWIDTH] IN BLOOD BY AUTOMATED COUNT: 12.7 % (ref 11.9–14.6)
EST. AVERAGE GLUCOSE BLD GHB EST-MCNC: 111 MG/DL
GLOBULIN SER CALC-MCNC: 3.2 G/DL (ref 2.3–3.5)
GLUCOSE SERPL-MCNC: 104 MG/DL (ref 70–99)
HBA1C MFR BLD: 5.5 % (ref 0–5.6)
HCT VFR BLD AUTO: 42.9 % (ref 41.1–50.3)
HCV AB SER QL: NONREACTIVE
HDLC SERPL-MCNC: 44 MG/DL (ref 40–60)
HDLC SERPL: 5.8 (ref 0–5)
HGB BLD-MCNC: 15.1 G/DL (ref 13.6–17.2)
HIV 1+2 AB+HIV1 P24 AG SERPL QL IA: NONREACTIVE
HIV 1/2 RESULT COMMENT: NORMAL
LDLC SERPL CALC-MCNC: 184 MG/DL (ref 0–100)
MCH RBC QN AUTO: 30.9 PG (ref 26.1–32.9)
MCHC RBC AUTO-ENTMCNC: 35.2 G/DL (ref 31.4–35)
MCV RBC AUTO: 87.7 FL (ref 82–102)
NRBC # BLD: 0 K/UL (ref 0–0.2)
PLATELET # BLD AUTO: 224 K/UL (ref 150–450)
PMV BLD AUTO: 10.4 FL (ref 9.4–12.3)
POTASSIUM SERPL-SCNC: 4.4 MMOL/L (ref 3.5–5.1)
PROT SERPL-MCNC: 7.5 G/DL (ref 6.3–8.2)
PSA SERPL-MCNC: 2.1 NG/ML (ref 0–4)
RBC # BLD AUTO: 4.89 M/UL (ref 4.23–5.6)
SODIUM SERPL-SCNC: 141 MMOL/L (ref 136–145)
TRIGL SERPL-MCNC: 133 MG/DL (ref 0–150)
TSH W FREE THYROID IF ABNORMAL: 1.39 UIU/ML (ref 0.27–4.2)
VLDLC SERPL CALC-MCNC: 27 MG/DL (ref 6–23)
WBC # BLD AUTO: 4.2 K/UL (ref 4.3–11.1)

## (undated) DEVICE — GAUZE,SPONGE,4"X4",12PLY,WOVEN,NS,LF: Brand: MEDLINE

## (undated) DEVICE — SYRINGE MEDICAL 3ML CLEAR PLASTIC STANDARD NON CONTROL LUERLOCK TIP DISPOSABLE

## (undated) DEVICE — DISPOSABLE BIOPSY VALVE MAJ-1555: Brand: SINGLE USE BIOPSY VALVE (STERILE)

## (undated) DEVICE — SYRINGE MED 10ML LUERLOCK TIP W/O SFTY DISP

## (undated) DEVICE — NEEDLE SYRINGE 18GA L1.5IN RED PLAS HUB S STL BLNT FILL W/O

## (undated) DEVICE — LUBE JELLY FOIL PACK 1.4 OZ: Brand: MEDLINE INDUSTRIES, INC.

## (undated) DEVICE — SINGLE PORT MANIFOLD: Brand: NEPTUNE 2

## (undated) DEVICE — CONNECTOR TBNG OD5-7MM O2 END DISP

## (undated) DEVICE — KENDALL RADIOLUCENT FOAM MONITORING ELECTRODE RECTANGULAR SHAPE: Brand: KENDALL

## (undated) DEVICE — AIRLIFE™ OXYGEN TUBING 7 FEET (2.1 M) CRUSH RESISTANT OXYGEN TUBING, VINYL TIPPED: Brand: AIRLIFE™

## (undated) DEVICE — ENDOSCOPIC KIT 1.1+ OP4 CA DE 2 GWN AAMI LEVEL 3